# Patient Record
Sex: FEMALE | Race: WHITE | NOT HISPANIC OR LATINO | URBAN - METROPOLITAN AREA
[De-identification: names, ages, dates, MRNs, and addresses within clinical notes are randomized per-mention and may not be internally consistent; named-entity substitution may affect disease eponyms.]

---

## 2018-11-05 ENCOUNTER — APPOINTMENT (RX ONLY)
Dept: URBAN - METROPOLITAN AREA CLINIC 343 | Facility: CLINIC | Age: 58
Setting detail: DERMATOLOGY
End: 2018-11-05

## 2018-11-05 DIAGNOSIS — D18.0 HEMANGIOMA: ICD-10-CM

## 2018-11-05 DIAGNOSIS — L81.4 OTHER MELANIN HYPERPIGMENTATION: ICD-10-CM

## 2018-11-05 DIAGNOSIS — L30.9 DERMATITIS, UNSPECIFIED: ICD-10-CM

## 2018-11-05 DIAGNOSIS — L82.1 OTHER SEBORRHEIC KERATOSIS: ICD-10-CM

## 2018-11-05 DIAGNOSIS — D22 MELANOCYTIC NEVI: ICD-10-CM

## 2018-11-05 PROBLEM — D22.5 MELANOCYTIC NEVI OF TRUNK: Status: ACTIVE | Noted: 2018-11-05

## 2018-11-05 PROBLEM — D48.5 NEOPLASM OF UNCERTAIN BEHAVIOR OF SKIN: Status: ACTIVE | Noted: 2018-11-05

## 2018-11-05 PROBLEM — L85.3 XEROSIS CUTIS: Status: ACTIVE | Noted: 2018-11-05

## 2018-11-05 PROBLEM — D18.01 HEMANGIOMA OF SKIN AND SUBCUTANEOUS TISSUE: Status: ACTIVE | Noted: 2018-11-05

## 2018-11-05 PROCEDURE — 11100: CPT

## 2018-11-05 PROCEDURE — ? PRESCRIPTION

## 2018-11-05 PROCEDURE — ? BIOPSY BY SHAVE METHOD

## 2018-11-05 PROCEDURE — 99203 OFFICE O/P NEW LOW 30 MIN: CPT | Mod: 25

## 2018-11-05 PROCEDURE — ? TREATMENT REGIMEN

## 2018-11-05 PROCEDURE — ? COUNSELING

## 2018-11-05 RX ORDER — TRIAMCINOLONE ACETONIDE 1 MG/G
CREAM TOPICAL BID
Qty: 1 | Refills: 0 | Status: ERX | COMMUNITY
Start: 2018-11-05

## 2018-11-05 RX ADMIN — TRIAMCINOLONE ACETONIDE: 1 CREAM TOPICAL at 20:27

## 2018-11-05 ASSESSMENT — LOCATION SIMPLE DESCRIPTION DERM
LOCATION SIMPLE: RIGHT 5TH TOE
LOCATION SIMPLE: RIGHT FOOT
LOCATION SIMPLE: CHEST
LOCATION SIMPLE: LEFT 5TH TOE
LOCATION SIMPLE: ABDOMEN
LOCATION SIMPLE: LEFT FOOT
LOCATION SIMPLE: LEFT BREAST

## 2018-11-05 ASSESSMENT — LOCATION DETAILED DESCRIPTION DERM
LOCATION DETAILED: LEFT MEDIAL SUPERIOR CHEST
LOCATION DETAILED: LEFT MEDIAL BREAST 9-10:00 REGION
LOCATION DETAILED: 3RD WEBSPACE RIGHT FOOT
LOCATION DETAILED: 2ND WEBSPACE LEFT FOOT
LOCATION DETAILED: 2ND WEBSPACE RIGHT FOOT
LOCATION DETAILED: RIGHT MEDIAL 5TH TOE
LOCATION DETAILED: EPIGASTRIC SKIN
LOCATION DETAILED: 1ST WEBSPACE RIGHT FOOT
LOCATION DETAILED: LOWER STERNUM
LOCATION DETAILED: LEFT MEDIAL 5TH TOE
LOCATION DETAILED: 1ST WEBSPACE LEFT FOOT
LOCATION DETAILED: LEFT MEDIAL BREAST 8-9:00 REGION

## 2018-11-05 ASSESSMENT — LOCATION ZONE DERM
LOCATION ZONE: FEET
LOCATION ZONE: TOE
LOCATION ZONE: TRUNK

## 2018-11-05 NOTE — PROCEDURE: TREATMENT REGIMEN
Initiate Treatment: Patient to apply Triamcinolone acetonide 0.1% topical cream to the lips and in between toes twice a day for three weeks.

## 2018-11-05 NOTE — PROCEDURE: TREATMENT REGIMEN
Plan: Patient to return in three weeks to recheck lips and feet. Patient to contact the office if any problems occur for further evaluation and management.

## 2019-07-24 NOTE — PROCEDURE: BIOPSY BY SHAVE METHOD
Wound Care: Mupirocin 6 y/o female with rash on face for 1 week. Possibly contact dermatitis. Plan: benadryl for pruritis and prednisolone.